# Patient Record
Sex: MALE | Race: WHITE | NOT HISPANIC OR LATINO | Employment: FULL TIME | URBAN - METROPOLITAN AREA
[De-identification: names, ages, dates, MRNs, and addresses within clinical notes are randomized per-mention and may not be internally consistent; named-entity substitution may affect disease eponyms.]

---

## 2023-06-12 ENCOUNTER — TELEPHONE (OUTPATIENT)
Dept: FAMILY MEDICINE CLINIC | Facility: CLINIC | Age: 23
End: 2023-06-12

## 2023-06-12 NOTE — TELEPHONE ENCOUNTER
This patient would like to become a new patient of yours, trans prefer name Kylah. Can I schedule?

## 2023-06-15 NOTE — TELEPHONE ENCOUNTER
Attempted to call patient to schedule initial eval with [facility]. Patient's voicemail is not set up.

## 2023-08-09 ENCOUNTER — TELEPHONE (OUTPATIENT)
Dept: BEHAVIORAL HEALTH | Facility: CLINIC | Age: 23
End: 2023-08-09

## 2023-08-09 NOTE — TELEPHONE ENCOUNTER
LVM reminding patient that we have not yet received their new patient paperwork for GREGORY Vincent. Patient may bring paperwork to appointment if they are unable to return it to our office at an earlier date.
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
## 2023-08-22 ENCOUNTER — OFFICE VISIT (OUTPATIENT)
Dept: BEHAVIORAL HEALTH | Facility: CLINIC | Age: 23
End: 2023-08-22
Payer: COMMERCIAL

## 2023-08-22 VITALS
HEIGHT: 71 IN | OXYGEN SATURATION: 98 % | DIASTOLIC BLOOD PRESSURE: 60 MMHG | BODY MASS INDEX: 23.03 KG/M2 | WEIGHT: 164.5 LBS | RESPIRATION RATE: 12 BRPM | SYSTOLIC BLOOD PRESSURE: 112 MMHG | HEART RATE: 70 BPM

## 2023-08-22 DIAGNOSIS — F41.1 GAD (GENERALIZED ANXIETY DISORDER): ICD-10-CM

## 2023-08-22 DIAGNOSIS — F33.1 MDD (MAJOR DEPRESSIVE DISORDER), RECURRENT EPISODE, MODERATE: Primary | ICD-10-CM

## 2023-08-22 DIAGNOSIS — F60.9 PERSONALITY DISORDER: ICD-10-CM

## 2023-08-22 DIAGNOSIS — R41.840 POOR CONCENTRATION: ICD-10-CM

## 2023-08-22 DIAGNOSIS — F64.9 GENDER DYSPHORIA: ICD-10-CM

## 2023-08-22 RX ORDER — BUPROPION HYDROCHLORIDE 150 MG/1
150 TABLET ORAL EVERY MORNING
Qty: 30 TABLET | Refills: 0 | Status: SHIPPED | OUTPATIENT
Start: 2023-08-22

## 2023-08-22 NOTE — PATIENT INSTRUCTIONS
Medication changes: Okay to start Wellbutrin/bupropion  mg take first thing in the morning with small amount of food at first, please read attached handout on new medication.    Therapy recommendation: Referral placed to [facility] for counseling and neuropsych testing, patient educated that there are multiple overlapping symptoms of depression/ADHD/anxiety/borderline personality disorder, please read attached handout on borderline personality disorder.  Give my office 7 days to reach out to you about referral.    GENERAL NEW PATIENT INSTRUCTIONS:  -The best way to get a hold of [facility] is to call the office at [phone] and ask to leave a message with his medical assistant.  Patient's are not able to message their mental health provider directly via [employer], please give my office up to 48 hours to respond to a patient call/question/refill request.  -Please call 911 or go to the nearest ER if you begin to have thoughts of harming yourself or other people.  -Refill requests will be made during normal office hours, Monday-Friday 8:00-5:30.  [facility] is out of the office on Wednesdays and weekends.  Follow-up appointments must be maintained in order for prescribing to continue.    -Tuesdays and Thursdays are [facility]'s in-office days, Mondays and Fridays are his telehealth days.  The decision to be seen virtually or in person is up to the discretion of the provider, not all behavioral health problems are appropriate for telehealth.    NO SHOW POLICY:  We understand unexpected circumstances arise; however, anytime you miss your appointment we are unable to provide you appropriate care.  In addition, each appointment missed could have been used to provide care for others.  We ask that you call at least 24 hours in advance to cancel or reschedule an appointment. We would like to take this opportunity to remind you of our policy stating patients who miss THREE or more appointments without cancelling or  rescheduling 24 hours in advance of the appointment may be subject to cancellation of any further visits with our clinic and recommendation to seek in-person services/visits. Please call [phone] to reschedule your appointment. If there are reasons that make it difficult for you to keep the appointments, please call and let us know how we can help. Please understand that medication prescribing will not continue without seeing your provider.       [facility]   [address] [place] [address]  P: [phone]  F: [phone]

## 2023-09-19 ENCOUNTER — TELEMEDICINE (OUTPATIENT)
Dept: BEHAVIORAL HEALTH | Facility: CLINIC | Age: 23
End: 2023-09-19
Payer: COMMERCIAL

## 2023-09-19 ENCOUNTER — OFFICE VISIT (OUTPATIENT)
Dept: FAMILY MEDICINE CLINIC | Facility: CLINIC | Age: 23
End: 2023-09-19
Payer: COMMERCIAL

## 2023-09-19 VITALS
HEART RATE: 73 BPM | BODY MASS INDEX: 22.4 KG/M2 | HEIGHT: 71 IN | WEIGHT: 160 LBS | OXYGEN SATURATION: 98 % | DIASTOLIC BLOOD PRESSURE: 62 MMHG | SYSTOLIC BLOOD PRESSURE: 120 MMHG | RESPIRATION RATE: 18 BRPM

## 2023-09-19 DIAGNOSIS — F41.1 GAD (GENERALIZED ANXIETY DISORDER): ICD-10-CM

## 2023-09-19 DIAGNOSIS — R41.840 POOR CONCENTRATION: ICD-10-CM

## 2023-09-19 DIAGNOSIS — E34.9 HORMONE IMBALANCE: ICD-10-CM

## 2023-09-19 DIAGNOSIS — F64.0 GENDER DYSPHORIA IN ADULT: Primary | ICD-10-CM

## 2023-09-19 DIAGNOSIS — F33.1 MDD (MAJOR DEPRESSIVE DISORDER), RECURRENT EPISODE, MODERATE: Primary | ICD-10-CM

## 2023-09-19 DIAGNOSIS — F60.3 BORDERLINE PERSONALITY DISORDER: ICD-10-CM

## 2023-09-19 PROCEDURE — 99203 OFFICE O/P NEW LOW 30 MIN: CPT | Performed by: FAMILY MEDICINE

## 2023-09-19 RX ORDER — ESTRADIOL VALERATE 20 MG/ML
INJECTION INTRAMUSCULAR
Qty: 5 ML | Refills: 3 | Status: SHIPPED | OUTPATIENT
Start: 2023-09-19

## 2023-09-19 RX ORDER — BUPROPION HYDROCHLORIDE 300 MG/1
300 TABLET ORAL EVERY MORNING
Qty: 30 TABLET | Refills: 0 | Status: SHIPPED | OUTPATIENT
Start: 2023-09-19

## 2023-09-19 NOTE — PROGRESS NOTES
Patient assessed today via MyChart through [employer] pt is at home in a secure environment and verbalizes privacy during interview. KENTON Holman is at home in a secure environment using a secure laptop.The patient's condition being diagnosed/treated is appropriate for telemedicine.The provider identified himself as well as his credentials.The patient, and/or patient's guardian, consent to be seen remotely, and when consent is given they understand that the consent allows for patient identifiable information to be sent to a third party as needed. They may refuse to be seen remotely at any time. The electronic data is encrypted and password protected, and the patient and/or guardian has been advised of the potential risks to privacy not withstanding such measures.    [facility] PROGRESS NOTE  [facility]  [address], [place] [address]    NAME: Vladimir Daniels     : 2000   MRN: [id number]   PCP: Provider, No Known     DATE: 2023    ALLERGY:Patient has no known allergies.     MEDICATIONS:  buPROPion XL     VITALS: There were no vitals taken for this visit. No LMP for male patient.     Subjective     Chief Complaint   Patient presents with    Depression    Anxiety    Follow-up      HPI:  23 y.o. male patient seen for follow up. Patient last seen roughly 1 month ago and Wellbutrin was started for depression/anxiety/poor concentration, since that time patient reports mild improvement is interested in increasing the dose, patient reports they never followed up with referral to [facility] for counseling.  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  Patient was educated previously on borderline personality disorder, reports they do have some traits.    Social Status:  Ethnic Group: Not  Or   Race: White Or   Marital Status: Single    Employment status: Full Time [employer] [place]      SUBSTANCE/SEXUAL HISTORY:   reports that she has been smoking cigarettes. She has a 0.75 pack-year smoking history. She has never used smokeless tobacco. She reports current alcohol use. She reports current drug use. Drugs: Marijuana and Other.   reports being sexually active and has had partner(s) who are female. She reports using the following method of birth control/protection: Condom.      FAMILY HISTORY:  family history includes ADD / ADHD in her maternal grandfather; Bipolar disorder in her maternal grandmother; Depression in her maternal grandmother.     PAST MEDICAL HISTORY   has no past medical history of Alcohol abuse, Head injury, Seizures, or Substance abuse.     Review of Systems   Constitutional: Negative.    Respiratory:  Negative for chest tightness and shortness of breath.    Cardiovascular:  Negative for chest pain.   Gastrointestinal:  Negative for nausea and vomiting.   Neurological:  Negative for dizziness and light-headedness.   Psychiatric/Behavioral:  Positive for decreased concentration, sleep disturbance and depressed mood. Negative for suicidal ideas. The patient is nervous/anxious.      Objective   Physical Exam  Constitutional:       Appearance: Normal appearance.   HENT:      Head: Normocephalic.   Pulmonary:      Effort: Pulmonary effort is normal.   Skin:     General: Skin is dry.   Neurological:      General: No focal deficit present.      Mental Status: She is alert and oriented to person, place, and time.   Psychiatric:         Behavior: Behavior normal.         Thought Content: Thought content normal.     PHQ-9 Depression Screening  Little interest or pleasure in doing things? (P) 1-->several days   Feeling down, depressed, or hopeless? (P) 0-->not at all   Trouble falling or staying asleep, or sleeping too much? (P) 2-->more than half the days   Feeling tired or having little energy?     Poor appetite or overeating? (P)  1-->several days   Feeling bad about yourself/you are a failure/have let yourself or your family down? (P) 1-->several days   Trouble concentrating on things? (P) 1-->several days   Psychomotor agitation/retardation (P) 0-->not at all   Thoughts about death/dying/suicide (P) 0-->not at all   PHQ-9 Total Score (P) 6   How difficult have these problems for you? (P) somewhat difficult     GAD7 Documentation:  Feeling nervous, anxious or on edge (P) 0   Not being able to stop or control worrying (P) 0   Worrying too much about different things (P) 0   Trouble relaxing (P) 1   Being so restless that it is hard to sit still (P) 1   Becoming easily annoyed or irritable (P) 0   Feeling Afraid as if something awful might happen (P) 0   ONUR Total Score (P) 2   How difficult have these problems made it for you? (P) Not difficult at all     MENTAL STATUS EXAM   General Appearance:  Cleanly groomed and dressed  Eye Contact:  Good eye contact  Attitude:  Cooperative  Motor Activity:  Normal gait, posture  Speech:  Normal rate, tone, volume  Mood and affect:  Normal, pleasant  Thought Process:  Goal-directed  Associations/ Thought Content:  No delusions  Hallucinations:  None  Suicidal Ideations:  Not present  Homicidal Ideation:  Not present  Orientation:  Person, place, time and situation  Fund of Knowledge:  Appropriate for age and educational level  Intellectual Functioning:  Average range  Insight:  Fair  Judgement:  Fair  Reliability:  Fair  Impulse Control:  Fair     LABS:  CBC          5/24/2023    00:05   CBC   WBC 8.51       RBC 5.07       Hemoglobin 14.6       Hematocrit 40.5       MCV 79.9       MCH 28.8       MCHC 36.0       RDW 12.4       Platelets 195          Details          This result is from an external source.                 Assessment    ASSESSMENT/TREATMENT PLAN/INSTRUCTIONS/EDUCATION    (F33.1) MDD (major depressive disorder), recurrent episode, moderate - Plan: buPROPion XL (Wellbutrin XL) 300 MG 24 hr  tablet    (F41.1) ONUR (generalized anxiety disorder) - Plan: buPROPion XL (Wellbutrin XL) 300 MG 24 hr tablet    (F60.3) Borderline personality traits    (R41.840) Poor concentration - Plan: buPROPion XL (Wellbutrin XL) 300 MG 24 hr tablet     -The above listed condition/conditions are improved somewhat    AFTER VISIT SUMMARY INSTRUCTIONS:    Medication changes: Okay to increase Wellbutrin to 300 mg extended release take first thing in the morning.  Patient instructed to try over-the-counter melatonin, the oral disintegrating tablet tends to work better it comes in a grape flavor.  Can address sleep issues more thoroughly at next appointment but I believe it will improve the longer they are on Wellbutrin.    Therapy recommendation: Patient instructed to follow-up with [facility] for counseling.    Return in 1 month (on 10/19/2023) for Medication Check.    MEDICATION ISSUES:  SANCHO: Reviewed 09/19/2023     Medications Discontinued During This Encounter   Medication Reason    buPROPion XL (Wellbutrin XL) 150 MG 24 hr tablet Reorder     New Medications Ordered This Visit   Medications    buPROPion XL (Wellbutrin XL) 300 MG 24 hr tablet     Sig: Take 1 tablet by mouth Every Morning.     Dispense:  30 tablet     Refill:  0      No orders of the defined types were placed in this encounter.    -Barriers: age < 25 or > 60 and new to treatment  -Strengths:  motivated     -Progress toward goal: Not at goal  -Functional Status: Moderate impairment   -Prognosis: Fair with Ongoing Treatment      Part of this note may be an electronic transcription/translation of spoken language to printed text using the Dragon Dictation System. Some of the data in this electronic note has been brought forward from a previous encounter, any necessary changes have been made, it has been reviewed by this APRN, and it is accurate.    This document has been electronically signed by THANH Vincent September 19, 2023 12:46  EDT

## 2023-09-19 NOTE — PATIENT INSTRUCTIONS
"Transfemme Resources    [facility] Transgender Support Group - Facebook group and Discord     World Professional Association for Transgender Health, Standards of Care  https://www.wpath.org/publications/soc - pages 38 and 40 especially     [facility], Transgender Health - [email or web address]://[email or web address].[email or web address]/guidelines      Alexy Schmidt Fred - [email or web address]    [facility] - https://[facility]/care/medical/transgender-health/     Parents, Families, and Friends of Lesbians and Malcom - https://www.pflag.org/ourtranslovedones  - good document for friends/family who need to learn the basics     [facility] - https://transequality.org/documents  - helps with name change, gender marker change, etc, is state specific     * * * * * *    Insurance will typically cover hormones, but if they don't, or if you need to pay out of pocket, use the following site.    Glomera     Can get injection supplies online - HRsoft. Recommended supplies as below:     1 ml, Luer-Hector Syringes      18 G x 1.5” hypodermic needles for drawing up      25 G x 5/8” or 27 G 1/2\" hypodermic needles for injecting      [facility] Health Injection Guide -   [email or web address]-content/uploads/MG-[email or web address]_TransHealth_InjectionGuide.pdf     Injection supplies:  Syringes   Needles   Alcohol swabs  Cotton balls/band-aids  Sharps container     Gender-affirming Mental Health Providers    [facility]  [address] [place] [address] [phone]  Dr. Vandana Eller, PhD  + several other therapists  Commercial and [place] Medicaid    [facility]/new-patients  [address] [facility] [address] [facility] [address] [place] [address] [phone]  Several therapists  Commercial Insurance    [facility]  [employer]  [address] [place] [address] [phone]  Dr. Zaki Moran, PsyD  + several other therapists  Commercial Insurance    [facility]  [employer]  [address] [place] [address]  [phone]  Several therapists  [place]mercial and [place] Medicaid    Ney White LCSW  (Online only)  [phone]  Phone: [phone] (on website)  [employer]  [email or web address]    Padmini Johnson [facility]  [employer] [facility]  [address] [facility]  [place] [address]  Phone :[phone]  Fax: [phone]  [email or web address]  [email or web address]    [facility]  Stuyvesantpsychologist.[employer]  [address], [facility] [address] [place] [address]  [phone]  Taylor Amaral [facility]  [place]mercial and [place] Medicaid    [facility]  [facility]"[employer]  [address] [facility] [address] [place] [address]   [phone]  SUKHI Morales  Commercial & sliding scale    [facility]  [email or web address]  [address], [facility] [address] [place] [address]  [phone]  Rosa M Gomez LCSW  [employer] & self-pay    [facility]  [facility]sSt[facility] Hospit[facility].org  [address] [facility] [address] [place] [address]  [phone]  Ирина Arechiga LCSW  [place] and [place] Medicaid    [facility] [email or web address]  [address] [place] [address] [phone]  SUKHI Hope  Commercial Insurance    [facility]  [facility].org  [address] [facility] [address] [place], [place] [address] [phone]  ANTONIO Vasquez  Self-pay with sliding scale    NORBERTO Avina (telehealth only)  Shai Gant, Counselor, [place], [address]  Psych[employer] Today  [phone]  Self-pay    24/7 Crisis Supports  National Crisis Text #: 'HOME' to 247-400  [employer] LGBTQ+ Crisis line: 1-530.780.1915  [employer] Crisis Text #: 'START' to 356-470

## 2023-09-19 NOTE — PATIENT INSTRUCTIONS
Medication changes: Okay to increase Wellbutrin to 300 mg extended release take first thing in the morning.  Patient instructed to try over-the-counter melatonin, the oral disintegrating tablet tends to work better it comes in a grape flavor.  Can address sleep issues more thoroughly at next appointment but I believe it will improve the longer they are on Wellbutrin.    Therapy recommendation: Patient instructed to follow-up with [facility] for counseling.    GENERAL NEW PATIENT INSTRUCTIONS:  -The best way to get a hold of River is to call the office at [phone] and ask to leave a message with his medical assistant.  Patient's are not able to message their mental health provider directly via Bloxy, please give my office up to 48 hours to respond to a patient call/question/refill request.  -Please call 911 or go to the nearest ER if you begin to have thoughts of harming yourself or other people.  -Refill requests will be made during normal office hours, Monday-Friday 8:00-5:30.  River is out of the office on Wednesdays and weekends.  Follow-up appointments must be maintained in order for prescribing to continue.    -Tuesdays and Thursdays are River's in-office days, Mondays and Fridays are his telehealth days.  The decision to be seen virtually or in person is up to the discretion of the provider, not all behavioral health problems are appropriate for telehealth.    NO SHOW POLICY:  We understand unexpected circumstances arise; however, anytime you miss your appointment we are unable to provide you appropriate care.  In addition, each appointment missed could have been used to provide care for others.  We ask that you call at least 24 hours in advance to cancel or reschedule an appointment. We would like to take this opportunity to remind you of our policy stating patients who miss THREE or more appointments without cancelling or rescheduling 24 hours in advance of the appointment may be subject  to cancellation of any further visits with our clinic and recommendation to seek in-person services/visits. Please call [phone] to reschedule your appointment. If there are reasons that make it difficult for you to keep the appointments, please call and let us know how we can help. Please understand that medication prescribing will not continue without seeing your provider.       [facility]   [address] [place] [address]  P: [phone]  F: [phone]

## 2023-09-19 NOTE — PROGRESS NOTES
"Chief Complaint  Establish Care and Gender Dysphoria in Adult    Subjective    History of Present Illness {CC  Problem List  Visit  Diagnosis   Encounters  Notes  Medications  Labs  Result Review Imaging  Media :23}     Vladimir Daniels presents to [facility] for Establish Care and Gender Dysphoria in Adult.  History of Present Illness     Here today as above. Has felt different since an early age. Was attracted to both men and women at the time, but didn't give much though to gender identity at the time. Began to use \"they\" pronouns a couple years ago, switched to \"she\" at the beginning of this year. Has support from mom, but hasn't yet come out fully to dad. He's described as \"quite Tenriism.\" Has good social support otherwise, is excited to begin her transition. Been looking into trans-affirming therapists as well.     Done some prelim reading about gender-affirming hormone therapy. No concerns about her ability to self-admin estradiol via injection. Takes bupropion daily, no other meds.     Objective     Vital Signs:   /62   Pulse 73   Resp 18   Ht 180.3 cm (71\")   Wt 72.6 kg (160 lb)   SpO2 98%   BMI 22.32 kg/m²   Physical Exam  Vitals and nursing note reviewed.   Constitutional:       General: She is not in acute distress.     Appearance: Normal appearance. She is not ill-appearing.   Cardiovascular:      Rate and Rhythm: Normal rate and regular rhythm.      Pulses: Normal pulses.      Heart sounds: Normal heart sounds. No murmur heard.  Pulmonary:      Effort: Pulmonary effort is normal. No respiratory distress.      Breath sounds: Normal breath sounds. No rales.   Neurological:      Mental Status: She is alert and oriented to person, place, and time. Mental status is at baseline.   Psychiatric:         Mood and Affect: Mood normal.         Behavior: Behavior normal.        Result Review  Data Reviewed:{ Labs  Result Review  Imaging  Med Tab  Media " :23}                   Assessment and Plan {CC Problem List  Visit Diagnosis  ROS  Review (Popup)  Health Maintenance  Quality  BestPractice  Medications  SmartSets  SnapShot Encounters  Media :23}   Diagnoses and all orders for this visit:    1. Gender dysphoria in adult (Primary)  -     estradiol valerate (DELESTROGEN) 20 MG/ML injection; Inject 0.15 ml (3 mg) subcutaneously twice weekly.  Dispense: 5 mL; Refill: 3    2. Hormone imbalance  -     estradiol valerate (DELESTROGEN) 20 MG/ML injection; Inject 0.15 ml (3 mg) subcutaneously twice weekly.  Dispense: 5 mL; Refill: 3    Established gender dysphoria, hormonal imbalance in a transgender patient with a strong desire for gender affirming hormone therapy.     Gender identity is consistent, persistent, and insistent. Discussed risks, benefits, alternatives, potential side effects of therapy, and typical follow up. Resources provided including [facility] Standards of Care, PFLAG “Our Trans Loved Ones”, and local support groups.     Meds as above. Gave detailed instructions as to proper subcutaneous injection technique and demonstrated the same. Given a bag of sample injection supplies to last until they are able to obtain their own supply. F/u with a phone call/message in 2-3 weeks, and an office visit in 2 months. Will order labs at f/u. Encouraged to sign up for and use [employer].    >50% of this 30 min visit spent in counseling about gender-affirming hormone therapy.     Patient was given instructions and counseling regarding her condition or for health maintenance advice. Please see specific information pulled into the AVS (placed there by myself) if appropriate.    Return in about 3 months (around 12/19/2023), or if symptoms worsen or fail to improve, for f/u gender-affirming hormone therapy.      ASHLEY Wall MD

## 2023-10-19 ENCOUNTER — TELEMEDICINE (OUTPATIENT)
Dept: BEHAVIORAL HEALTH | Facility: CLINIC | Age: 23
End: 2023-10-19
Payer: COMMERCIAL

## 2023-10-19 DIAGNOSIS — F60.3 BORDERLINE PERSONALITY DISORDER: ICD-10-CM

## 2023-10-19 DIAGNOSIS — R41.840 POOR CONCENTRATION: ICD-10-CM

## 2023-10-19 DIAGNOSIS — F33.1 MDD (MAJOR DEPRESSIVE DISORDER), RECURRENT EPISODE, MODERATE: Primary | ICD-10-CM

## 2023-10-19 DIAGNOSIS — F41.1 GAD (GENERALIZED ANXIETY DISORDER): ICD-10-CM

## 2023-10-19 RX ORDER — BUPROPION HYDROCHLORIDE 300 MG/1
300 TABLET ORAL EVERY MORNING
Qty: 90 TABLET | Refills: 0 | Status: SHIPPED | OUTPATIENT
Start: 2023-10-19

## 2023-10-19 NOTE — PROGRESS NOTES
Patient assessed today via MyChart through EPIC pt is at home in a secure environment and verbalizes privacy during interview. KENTON Holman is at home in a secure environment using a secure laptop.The patient's condition being diagnosed/treated is appropriate for telemedicine.The provider identified himself as well as his credentials.The patient, and/or patient's guardian, consent to be seen remotely, and when consent is given they understand that the consent allows for patient identifiable information to be sent to a third party as needed. They may refuse to be seen remotely at any time. The electronic data is encrypted and password protected, and the patient and/or guardian has been advised of the potential risks to privacy not withstanding such measures.    [facility] PROGRESS NOTE  [facility]  [address], [place] [address]    NAME: Vladimir Daniels     : 2000   MRN: [id number]     DATE: 10/19/2023    ALLERGY:Patient has no known allergies.     MEDICATIONS:  buPROPion XL  estradiol valerate     VITALS: There were no vitals taken for this visit. No LMP recorded.     Subjective     Chief Complaint   Patient presents with    Depression    Anxiety      HPI:  23 y.o. adult patient seen for follow up.  Patient seen last roughly 1 month ago at that time Wellbutrin dose was increased to 300 mg due to only partial improvement noted, since that time patient reports things are all right overall, reports they need to work on healthier habits including personal hygiene, nutrition, sleep, exercise in order to feel better which I agree with, reports work going good, patient reports they plan on moving to [place] November 15 will be living with a friend, does not have a job lined up as of yet, reports they are financially ready to do so.  Patient did not follow-up with [facility] or [facility] as planned.    Social Status:  Ethnic Group: Not  Or    Race: White Or   Marital Status: Single   Employment status: Full Time 6/10 [employer]      SUBSTANCE/SEXUAL HISTORY:   reports that she has quit smoking. Her smoking use included cigarettes. She has a 0.75 pack-year smoking history. She has been exposed to tobacco smoke. She has never used smokeless tobacco. She reports current alcohol use of about 5.0 standard drinks of alcohol per week. She reports current drug use. Drugs: Marijuana and Other.   reports being sexually active and has had partner(s) who are female. She reports using the following method of birth control/protection: Condom.      FAMILY HISTORY:  family history includes ADD / ADHD in her maternal grandfather; Bipolar disorder in her maternal grandmother; Depression in her maternal grandmother and mother; No Known Problems in her brother, father, and sister.     PAST MEDICAL HISTORY   has a past medical history of Anxiety (2019) and Depression (2018).    She has no past medical history of Alcohol abuse, Head injury, Seizures, or Substance abuse.     Review of Systems   Constitutional: Negative.    Respiratory:  Negative for chest tightness and shortness of breath.    Cardiovascular:  Negative for chest pain.   Neurological:  Negative for dizziness and light-headedness.   Psychiatric/Behavioral:  Negative for sleep disturbance and suicidal ideas.      Objective   Physical Exam  Constitutional:       Appearance: Normal appearance.   HENT:      Head: Normocephalic.   Pulmonary:      Effort: Pulmonary effort is normal.   Skin:     General: Skin is dry.   Neurological:      General: No focal deficit present.      Mental Status: She is alert and oriented to person, place, and time.   Psychiatric:         Behavior: Behavior normal.         Thought Content: Thought content normal.     PHQ-9 Depression Screening  Little interest or pleasure in doing things? (P) 1-->several days   Feeling down, depressed, or hopeless? (P) 0-->not at all   Trouble  falling or staying asleep, or sleeping too much? (P) 1-->several days   Feeling tired or having little energy?     Poor appetite or overeating? (P) 1-->several days   Feeling bad about yourself/you are a failure/have let yourself or your family down? (P) 1-->several days   Trouble concentrating on things? (P) 0-->not at all   Psychomotor agitation/retardation (P) 0-->not at all   Thoughts about death/dying/suicide (P) 0-->not at all   PHQ-9 Total Score (P) 5   How difficult have these problems for you? (P) somewhat difficult     GAD7 Documentation:  Feeling nervous, anxious or on edge (P) 0   Not being able to stop or control worrying (P) 0   Worrying too much about different things (P) 1   Trouble relaxing (P) 0   Being so restless that it is hard to sit still (P) 0   Becoming easily annoyed or irritable (P) 1   Feeling Afraid as if something awful might happen (P) 1   ONUR Total Score (P) 3   How difficult have these problems made it for you? (P) Not difficult at all     MENTAL STATUS EXAM   General Appearance:  Cleanly groomed and dressed  Eye Contact:  Fair  Attitude:  Cooperative  Speech:  Monotone  Mood and affect:  Flat  Thought Process:  Goal-directed  Associations/ Thought Content:  No delusions  Hallucinations:  None  Suicidal Ideations:  Not present  Homicidal Ideation:  Not present  Sensorium:  Alert  Orientation:  Person, place, time and situation  Fund of Knowledge:  Appropriate for age and educational level  Intellectual Functioning:  Average range  Insight:  Fair  Judgement:  Fair  Reliability:  Fair  Impulse Control:  Fair       LABS:  CBC          5/24/2023    00:05   CBC   WBC 8.51       RBC 5.07       Hemoglobin 14.6       Hematocrit 40.5       MCV 79.9       MCH 28.8       MCHC 36.0       RDW 12.4       Platelets 195          Details          This result is from an external source.                 Assessment    ASSESSMENT/TREATMENT PLAN/INSTRUCTIONS/EDUCATION    (F33.1) MDD (major depressive  disorder), recurrent episode, moderate - Plan: buPROPion XL (Wellbutrin XL) 300 MG 24 hr tablet    (F41.1) ONUR (generalized anxiety disorder) - Plan: buPROPion XL (Wellbutrin XL) 300 MG 24 hr tablet    (F60.3) Borderline personality traits - Plan: buPROPion XL (Wellbutrin XL) 300 MG 24 hr tablet    (R41.840) Poor concentration - Plan: buPROPion XL (Wellbutrin XL) 300 MG 24 hr tablet     -MDD/ONUR: Stable/improved with current treatment. Pt reports daily compliance with medications, denies side effects.     AFTER VISIT SUMMARY INSTRUCTIONS:    Medication changes:   Wellbutrin, continue as previously ordered, 90-day supply sent to pharmacy to hold patient over until they can find a new mental health provider in [place], advised to look into [email or web address].    Return if symptoms worsen or fail to improve.    MEDICATION ISSUES:  SANCHO: Reviewed 10/19/2023     Medications Discontinued During This Encounter   Medication Reason    buPROPion XL (Wellbutrin XL) 300 MG 24 hr tablet Reorder       New Medications Ordered This Visit   Medications    buPROPion XL (Wellbutrin XL) 300 MG 24 hr tablet     Sig: Take 1 tablet by mouth Every Morning.     Dispense:  90 tablet     Refill:  0        No orders of the defined types were placed in this encounter.    -Barriers: age < 25 or > 60 and new to treatment  -Strengths:  motivated     -Progress toward goal: Not at goal  -Functional Status: Moderate impairment   -Prognosis: Fair with Ongoing Treatment      Part of this note may be an electronic transcription/translation of spoken language to printed text using the Dragon Dictation System. Some of the data in this electronic note has been brought forward from a previous encounter, any necessary changes have been made, it has been reviewed by this APRN, and it is accurate.    This document has been electronically signed by THANH Vincent October 19, 2023 11:00 EDT

## 2023-12-26 ENCOUNTER — OFFICE VISIT (OUTPATIENT)
Dept: FAMILY MEDICINE CLINIC | Facility: CLINIC | Age: 23
End: 2023-12-26
Payer: COMMERCIAL

## 2023-12-26 VITALS
OXYGEN SATURATION: 97 % | RESPIRATION RATE: 18 BRPM | BODY MASS INDEX: 22.82 KG/M2 | HEART RATE: 85 BPM | SYSTOLIC BLOOD PRESSURE: 122 MMHG | DIASTOLIC BLOOD PRESSURE: 62 MMHG | HEIGHT: 71 IN | WEIGHT: 163 LBS

## 2023-12-26 DIAGNOSIS — F64.0 GENDER DYSPHORIA IN ADULT: Primary | ICD-10-CM

## 2023-12-26 DIAGNOSIS — R41.840 POOR CONCENTRATION: ICD-10-CM

## 2023-12-26 DIAGNOSIS — E34.9 HORMONE IMBALANCE: ICD-10-CM

## 2023-12-26 DIAGNOSIS — F41.1 GAD (GENERALIZED ANXIETY DISORDER): ICD-10-CM

## 2023-12-26 DIAGNOSIS — F60.3 BORDERLINE PERSONALITY DISORDER: ICD-10-CM

## 2023-12-26 DIAGNOSIS — F33.1 MDD (MAJOR DEPRESSIVE DISORDER), RECURRENT EPISODE, MODERATE: ICD-10-CM

## 2023-12-26 DIAGNOSIS — Z51.81 THERAPEUTIC DRUG MONITORING: ICD-10-CM

## 2023-12-26 RX ORDER — ESTRADIOL VALERATE 20 MG/ML
INJECTION INTRAMUSCULAR
Qty: 5 ML | Refills: 3 | Status: SHIPPED | OUTPATIENT
Start: 2023-12-26 | End: 2023-12-26 | Stop reason: SDUPTHER

## 2023-12-26 RX ORDER — ESTRADIOL VALERATE 20 MG/ML
INJECTION INTRAMUSCULAR
Qty: 5 ML | Refills: 3 | Status: SHIPPED | OUTPATIENT
Start: 2023-12-26

## 2023-12-26 RX ORDER — BUPROPION HYDROCHLORIDE 300 MG/1
300 TABLET ORAL EVERY MORNING
Qty: 90 TABLET | Refills: 3 | Status: SHIPPED | OUTPATIENT
Start: 2023-12-26

## 2023-12-26 RX ORDER — BUPROPION HYDROCHLORIDE 300 MG/1
300 TABLET ORAL EVERY MORNING
Qty: 90 TABLET | Refills: 3 | Status: SHIPPED | OUTPATIENT
Start: 2023-12-26 | End: 2023-12-26 | Stop reason: SDUPTHER

## 2023-12-26 NOTE — PROGRESS NOTES
"Chief Complaint  Gender Dysphoria in Adult    Subjective    History of Present Illness {CC  Problem List  Visit  Diagnosis   Encounters  Notes  Medications  Labs  Result Review Imaging  Media :23}     Vladimir Daniels presents to [facility] for Gender Dysphoria in Adult.  History of Present Illness     Here today for follow-up as above. Has been doing fairly well overall. Due for a check of hormone labs today. No problems with self administration, no injection site reactions. Happy with the state of her transition. Needs refill of estradiol today.    Also asking for refill of bupropion. Was seen by Maxwell Moyer previously and diagnosed with anxiety and depression. Has been taking bupropion with good symptom control overall. Has helped with concentration additionally. No unwanted side effects.    Objective     Vital Signs:   /62   Pulse 85   Resp 18   Ht 180.3 cm (71\")   Wt 73.9 kg (163 lb)   SpO2 97%   BMI 22.73 kg/m²   Physical Exam  Vitals and nursing note reviewed.   Constitutional:       General: She is not in acute distress.     Appearance: Normal appearance. She is not ill-appearing.   Cardiovascular:      Rate and Rhythm: Normal rate and regular rhythm.      Pulses: Normal pulses.      Heart sounds: Normal heart sounds. No murmur heard.  Pulmonary:      Effort: Pulmonary effort is normal. No respiratory distress.      Breath sounds: Normal breath sounds. No rales.   Neurological:      Mental Status: She is alert and oriented to person, place, and time. Mental status is at baseline.   Psychiatric:         Mood and Affect: Mood normal.         Behavior: Behavior normal.          Result Review  Data Reviewed:{ Labs  Result Review  Imaging  Med Tab  Media :23}                   Assessment and Plan {CC Problem List  Visit Diagnosis  ROS  Review (Popup)  Health Maintenance  Quality  BestPractice  Medications  SmartSets  SnapShot Encounters  " Media :23}   Diagnoses and all orders for this visit:    1. Gender dysphoria in adult (Primary)  -     Estradiol  -     Estrone  -     Testosterone  -     Comprehensive Metabolic Panel  -     Lipid Panel  -     Discontinue: estradiol valerate (DELESTROGEN) 20 MG/ML injection; Inject 0.15 ml (3 mg) subcutaneously twice weekly.  Dispense: 5 mL; Refill: 3  -     estradiol valerate (DELESTROGEN) 20 MG/ML injection; Inject 0.15 ml (3 mg) subcutaneously twice weekly.  Dispense: 5 mL; Refill: 3    2. Hormone imbalance  -     Estradiol  -     Estrone  -     Testosterone  -     Comprehensive Metabolic Panel  -     Lipid Panel  -     Discontinue: estradiol valerate (DELESTROGEN) 20 MG/ML injection; Inject 0.15 ml (3 mg) subcutaneously twice weekly.  Dispense: 5 mL; Refill: 3  -     estradiol valerate (DELESTROGEN) 20 MG/ML injection; Inject 0.15 ml (3 mg) subcutaneously twice weekly.  Dispense: 5 mL; Refill: 3    3. Therapeutic drug monitoring  -     Estradiol  -     Estrone  -     Testosterone  -     Comprehensive Metabolic Panel  -     Lipid Panel    4. MDD (major depressive disorder), recurrent episode, moderate  -     Discontinue: buPROPion XL (Wellbutrin XL) 300 MG 24 hr tablet; Take 1 tablet by mouth Every Morning.  Dispense: 90 tablet; Refill: 3  -     buPROPion XL (Wellbutrin XL) 300 MG 24 hr tablet; Take 1 tablet by mouth Every Morning.  Dispense: 90 tablet; Refill: 3    5. ONUR (generalized anxiety disorder)  -     Discontinue: buPROPion XL (Wellbutrin XL) 300 MG 24 hr tablet; Take 1 tablet by mouth Every Morning.  Dispense: 90 tablet; Refill: 3  -     buPROPion XL (Wellbutrin XL) 300 MG 24 hr tablet; Take 1 tablet by mouth Every Morning.  Dispense: 90 tablet; Refill: 3    6. Borderline personality traits  -     Discontinue: buPROPion XL (Wellbutrin XL) 300 MG 24 hr tablet; Take 1 tablet by mouth Every Morning.  Dispense: 90 tablet; Refill: 3  -     buPROPion XL (Wellbutrin XL) 300 MG 24 hr tablet; Take 1 tablet by  mouth Every Morning.  Dispense: 90 tablet; Refill: 3    7. Poor concentration  -     Discontinue: buPROPion XL (Wellbutrin XL) 300 MG 24 hr tablet; Take 1 tablet by mouth Every Morning.  Dispense: 90 tablet; Refill: 3  -     buPROPion XL (Wellbutrin XL) 300 MG 24 hr tablet; Take 1 tablet by mouth Every Morning.  Dispense: 90 tablet; Refill: 3    Orders as above. I will contact her with results as available. Refilled bupropion and estradiol as requested. Continue regimen as prescribed.    Recommended follow up as below. Encouraged communication via Apajahart in the meantime.     Patient was given instructions and counseling regarding her condition or for health maintenance advice. Please see specific information pulled into the AVS (placed there by myself) if appropriate.    Return in about 3 months (around 3/26/2024), or if symptoms worsen or fail to improve, for f/u gender-affirming hormone therapy.    ASHLEY Wall MD

## 2023-12-27 LAB
ALBUMIN SERPL-MCNC: 4.5 G/DL (ref 4.3–5.2)
ALBUMIN/GLOB SERPL: 2.1 {RATIO} (ref 1.2–2.2)
ALP SERPL-CCNC: 56 IU/L (ref 44–121)
ALT SERPL-CCNC: 15 IU/L (ref 0–44)
AST SERPL-CCNC: 19 IU/L (ref 0–40)
BILIRUB SERPL-MCNC: 0.5 MG/DL (ref 0–1.2)
BUN SERPL-MCNC: 6 MG/DL (ref 6–20)
BUN/CREAT SERPL: 10 (ref 9–20)
CALCIUM SERPL-MCNC: 9.5 MG/DL (ref 8.7–10.2)
CHLORIDE SERPL-SCNC: 102 MMOL/L (ref 96–106)
CHOLEST SERPL-MCNC: 144 MG/DL (ref 100–199)
CO2 SERPL-SCNC: 23 MMOL/L (ref 20–29)
CREAT SERPL-MCNC: 0.59 MG/DL (ref 0.76–1.27)
EGFRCR SERPLBLD CKD-EPI 2021: 141 ML/MIN/1.73
ESTRADIOL SERPL-MCNC: 105 PG/ML (ref 7.6–42.6)
GLOBULIN SER CALC-MCNC: 2.1 G/DL (ref 1.5–4.5)
GLUCOSE SERPL-MCNC: 90 MG/DL (ref 70–99)
HDLC SERPL-MCNC: 58 MG/DL
LDLC SERPL CALC-MCNC: 73 MG/DL (ref 0–99)
POTASSIUM SERPL-SCNC: 4.6 MMOL/L (ref 3.5–5.2)
PROT SERPL-MCNC: 6.6 G/DL (ref 6–8.5)
SODIUM SERPL-SCNC: 138 MMOL/L (ref 134–144)
TESTOST SERPL-MCNC: 11 NG/DL (ref 264–916)
TRIGL SERPL-MCNC: 61 MG/DL (ref 0–149)
VLDLC SERPL CALC-MCNC: 13 MG/DL (ref 5–40)

## 2023-12-28 LAB — ESTRONE SERPL-MCNC: 72 PG/ML (ref 0–174)

## 2024-03-26 ENCOUNTER — OFFICE VISIT (OUTPATIENT)
Dept: FAMILY MEDICINE CLINIC | Facility: CLINIC | Age: 24
End: 2024-03-26
Payer: COMMERCIAL

## 2024-03-26 VITALS
BODY MASS INDEX: 22.12 KG/M2 | DIASTOLIC BLOOD PRESSURE: 62 MMHG | SYSTOLIC BLOOD PRESSURE: 116 MMHG | OXYGEN SATURATION: 99 % | RESPIRATION RATE: 18 BRPM | HEART RATE: 88 BPM | HEIGHT: 71 IN | WEIGHT: 158 LBS

## 2024-03-26 DIAGNOSIS — F60.3 BORDERLINE PERSONALITY DISORDER: ICD-10-CM

## 2024-03-26 DIAGNOSIS — F64.0 GENDER DYSPHORIA IN ADULT: Primary | ICD-10-CM

## 2024-03-26 DIAGNOSIS — R41.840 POOR CONCENTRATION: ICD-10-CM

## 2024-03-26 DIAGNOSIS — E34.9 HORMONE IMBALANCE: ICD-10-CM

## 2024-03-26 DIAGNOSIS — F41.1 GAD (GENERALIZED ANXIETY DISORDER): ICD-10-CM

## 2024-03-26 DIAGNOSIS — F33.1 MDD (MAJOR DEPRESSIVE DISORDER), RECURRENT EPISODE, MODERATE: ICD-10-CM

## 2024-03-26 DIAGNOSIS — Z51.81 THERAPEUTIC DRUG MONITORING: ICD-10-CM

## 2024-03-26 RX ORDER — ESTRADIOL VALERATE 20 MG/ML
INJECTION INTRAMUSCULAR
Qty: 5 ML | Refills: 3 | Status: SHIPPED | OUTPATIENT
Start: 2024-03-26

## 2024-03-26 RX ORDER — BUPROPION HYDROCHLORIDE 300 MG/1
300 TABLET ORAL EVERY MORNING
Qty: 90 TABLET | Refills: 3 | Status: SHIPPED | OUTPATIENT
Start: 2024-03-26

## 2024-03-26 NOTE — PROGRESS NOTES
"Chief Complaint  Gender Dysphoria in Adult (3 month f/u) and Jaw Pain (L side )    Subjective    History of Present Illness    Vladimir Daniels presents to [facility] for Gender Dysphoria in Adult (3 month f/u) and Jaw Pain (L side ).  History of Present Illness     Here today for follow-up as above. Doing well overall, continues on her estradiol injections as prescribed. No problems with self administration, no injection site reactions. Very happy with the state of her transition, no unwanted changes. Continues to experience some breast growth. Hoping for a refill of estradiol today. Also due for some routine blood work. Typically does her injections late Wednesday, has held off until now for labs.    Continues on bupropion for management of anxiety depression. Finds that it has helped significantly, no unwanted side effects. No problems with adherence or tolerance. Hoping for refill today.    Objective     Vital Signs:   /62   Pulse 88   Resp 18   Ht 180.3 cm (71\")   Wt 71.7 kg (158 lb)   SpO2 99%   BMI 22.04 kg/m²   Physical Exam  Vitals and nursing note reviewed.   Constitutional:       General: She is not in acute distress.     Appearance: Normal appearance. She is not ill-appearing.   Cardiovascular:      Rate and Rhythm: Normal rate and regular rhythm.      Pulses: Normal pulses.      Heart sounds: Normal heart sounds. No murmur heard.  Pulmonary:      Effort: Pulmonary effort is normal. No respiratory distress.      Breath sounds: Normal breath sounds. No rales.   Neurological:      Mental Status: She is alert and oriented to person, place, and time. Mental status is at baseline.   Psychiatric:         Mood and Affect: Mood normal.         Behavior: Behavior normal.                        Assessment and Plan   Diagnoses and all orders for this visit:    1. Gender dysphoria in adult (Primary)  -     Estradiol  -     Estrone  -     Testosterone  -     Comprehensive " Metabolic Panel  -     Progesterone  -     Lipid Panel  -     estradiol valerate (DELESTROGEN) 20 MG/ML injection; Inject 0.15 ml (3 mg) subcutaneously twice weekly.  Dispense: 5 mL; Refill: 3    2. Hormone imbalance  -     Estradiol  -     Estrone  -     Testosterone  -     Comprehensive Metabolic Panel  -     Progesterone  -     Lipid Panel  -     estradiol valerate (DELESTROGEN) 20 MG/ML injection; Inject 0.15 ml (3 mg) subcutaneously twice weekly.  Dispense: 5 mL; Refill: 3    3. Therapeutic drug monitoring  -     Estradiol  -     Estrone  -     Testosterone  -     Comprehensive Metabolic Panel  -     Progesterone  -     Lipid Panel    4. MDD (major depressive disorder), recurrent episode, moderate  -     buPROPion XL (Wellbutrin XL) 300 MG 24 hr tablet; Take 1 tablet by mouth Every Morning.  Dispense: 90 tablet; Refill: 3    5. ONUR (generalized anxiety disorder)  -     buPROPion XL (Wellbutrin XL) 300 MG 24 hr tablet; Take 1 tablet by mouth Every Morning.  Dispense: 90 tablet; Refill: 3    6. Borderline personality traits  -     buPROPion XL (Wellbutrin XL) 300 MG 24 hr tablet; Take 1 tablet by mouth Every Morning.  Dispense: 90 tablet; Refill: 3    7. Poor concentration  -     buPROPion XL (Wellbutrin XL) 300 MG 24 hr tablet; Take 1 tablet by mouth Every Morning.  Dispense: 90 tablet; Refill: 3    Orders as above. I will contact her with results as available. Continue regimen as prescribed for now. Refills as requested.    Recommended follow up as below. Encouraged communication via [employer] in the meantime.     Patient was given instructions and counseling regarding her condition or for health maintenance advice. Please see specific information pulled into the AVS (placed there by myself) if appropriate.    Return in about 6 months (around 9/26/2024), or if symptoms worsen or fail to improve, for f/u gender-affirming hormone therapy.    ASHLEY Wall MD

## 2024-03-27 LAB
ALBUMIN SERPL-MCNC: 4.6 G/DL (ref 3.5–5.2)
ALBUMIN/GLOB SERPL: 2.6 G/DL
ALP SERPL-CCNC: 54 U/L (ref 39–117)
ALT SERPL-CCNC: 18 U/L (ref 1–41)
AST SERPL-CCNC: 17 U/L (ref 1–40)
BILIRUB SERPL-MCNC: 0.3 MG/DL (ref 0–1.2)
BUN SERPL-MCNC: 8 MG/DL (ref 6–20)
BUN/CREAT SERPL: 11.6 (ref 7–25)
CALCIUM SERPL-MCNC: 9.3 MG/DL (ref 8.6–10.5)
CHLORIDE SERPL-SCNC: 104 MMOL/L (ref 98–107)
CHOLEST SERPL-MCNC: 143 MG/DL (ref 0–200)
CO2 SERPL-SCNC: 24.8 MMOL/L (ref 22–29)
CREAT SERPL-MCNC: 0.69 MG/DL (ref 0.76–1.27)
EGFRCR SERPLBLD CKD-EPI 2021: 134.2 ML/MIN/1.73
ESTRADIOL SERPL-MCNC: 237 PG/ML (ref 7.6–42.6)
ESTRONE SERPL-MCNC: 73 PG/ML (ref 0–174)
GLOBULIN SER CALC-MCNC: 1.8 GM/DL
GLUCOSE SERPL-MCNC: 76 MG/DL (ref 65–99)
HDLC SERPL-MCNC: 51 MG/DL (ref 40–60)
LDLC SERPL CALC-MCNC: 75 MG/DL (ref 0–100)
POTASSIUM SERPL-SCNC: 4.1 MMOL/L (ref 3.5–5.2)
PROGEST SERPL-MCNC: 0.1 NG/ML (ref 0–0.5)
PROT SERPL-MCNC: 6.4 G/DL (ref 6–8.5)
SODIUM SERPL-SCNC: 139 MMOL/L (ref 136–145)
TESTOST SERPL-MCNC: 9 NG/DL (ref 264–916)
TRIGL SERPL-MCNC: 89 MG/DL (ref 0–150)
VLDLC SERPL CALC-MCNC: 17 MG/DL (ref 5–40)

## 2024-09-19 ENCOUNTER — OFFICE VISIT (OUTPATIENT)
Dept: FAMILY MEDICINE CLINIC | Facility: CLINIC | Age: 24
End: 2024-09-19
Payer: COMMERCIAL

## 2024-09-19 VITALS
HEART RATE: 67 BPM | HEIGHT: 71 IN | DIASTOLIC BLOOD PRESSURE: 72 MMHG | RESPIRATION RATE: 14 BRPM | BODY MASS INDEX: 22.55 KG/M2 | SYSTOLIC BLOOD PRESSURE: 120 MMHG | OXYGEN SATURATION: 98 % | WEIGHT: 161.1 LBS

## 2024-09-19 DIAGNOSIS — Z13.1 SCREENING FOR DIABETES MELLITUS: ICD-10-CM

## 2024-09-19 DIAGNOSIS — Z11.59 ENCOUNTER FOR HEPATITIS C SCREENING TEST FOR LOW RISK PATIENT: ICD-10-CM

## 2024-09-19 DIAGNOSIS — F64.0 GENDER DYSPHORIA IN ADULT: ICD-10-CM

## 2024-09-19 DIAGNOSIS — Z13.6 ENCOUNTER FOR LIPID SCREENING FOR CARDIOVASCULAR DISEASE: ICD-10-CM

## 2024-09-19 DIAGNOSIS — Z00.00 ROUTINE HEALTH MAINTENANCE: Primary | ICD-10-CM

## 2024-09-19 DIAGNOSIS — Z23 NEED FOR VACCINATION: ICD-10-CM

## 2024-09-19 DIAGNOSIS — Z51.81 THERAPEUTIC DRUG MONITORING: ICD-10-CM

## 2024-09-19 DIAGNOSIS — Z13.220 ENCOUNTER FOR LIPID SCREENING FOR CARDIOVASCULAR DISEASE: ICD-10-CM

## 2024-09-19 DIAGNOSIS — E34.9 HORMONE IMBALANCE: ICD-10-CM

## 2024-09-19 PROCEDURE — 99395 PREV VISIT EST AGE 18-39: CPT | Performed by: FAMILY MEDICINE

## 2024-09-19 PROCEDURE — 99213 OFFICE O/P EST LOW 20 MIN: CPT | Performed by: FAMILY MEDICINE

## 2024-09-19 RX ORDER — ESTRADIOL VALERATE 20 MG/ML
INJECTION INTRAMUSCULAR
Qty: 5 ML | Refills: 3 | Status: SHIPPED | OUTPATIENT
Start: 2024-09-19

## 2025-03-18 LAB
ALBUMIN SERPL-MCNC: 4.4 G/DL (ref 4.3–5.2)
ALP SERPL-CCNC: 48 IU/L (ref 44–121)
ALT SERPL-CCNC: 13 IU/L (ref 0–44)
AST SERPL-CCNC: 20 IU/L (ref 0–40)
BILIRUB SERPL-MCNC: 0.4 MG/DL (ref 0–1.2)
BUN SERPL-MCNC: 7 MG/DL (ref 6–20)
BUN/CREAT SERPL: 13 (ref 9–20)
CALCIUM SERPL-MCNC: 9.1 MG/DL (ref 8.7–10.2)
CHLORIDE SERPL-SCNC: 103 MMOL/L (ref 96–106)
CHOLEST SERPL-MCNC: 132 MG/DL (ref 100–199)
CO2 SERPL-SCNC: 23 MMOL/L (ref 20–29)
CREAT SERPL-MCNC: 0.56 MG/DL (ref 0.76–1.27)
EGFRCR SERPLBLD CKD-EPI 2021: 142 ML/MIN/1.73
ESTRADIOL SERPL-MCNC: 128 PG/ML (ref 7.6–42.6)
ESTRONE SERPL-MCNC: 67 PG/ML (ref 0–174)
GLOBULIN SER CALC-MCNC: 2.1 G/DL (ref 1.5–4.5)
GLUCOSE SERPL-MCNC: 108 MG/DL (ref 70–99)
HCV IGG SERPL QL IA: NON REACTIVE
HDLC SERPL-MCNC: 52 MG/DL
LDLC SERPL CALC-MCNC: 69 MG/DL (ref 0–99)
POTASSIUM SERPL-SCNC: 4.1 MMOL/L (ref 3.5–5.2)
PROGEST SERPL-MCNC: 0.4 NG/ML (ref 0–0.5)
PROT SERPL-MCNC: 6.5 G/DL (ref 6–8.5)
SODIUM SERPL-SCNC: 136 MMOL/L (ref 134–144)
TESTOST SERPL-MCNC: 29 NG/DL (ref 264–916)
TRIGL SERPL-MCNC: 50 MG/DL (ref 0–149)
VLDLC SERPL CALC-MCNC: 11 MG/DL (ref 5–40)

## 2025-04-21 ENCOUNTER — OFFICE VISIT (OUTPATIENT)
Dept: FAMILY MEDICINE CLINIC | Facility: CLINIC | Age: 25
End: 2025-04-21
Payer: COMMERCIAL

## 2025-04-21 VITALS
WEIGHT: 167.5 LBS | SYSTOLIC BLOOD PRESSURE: 118 MMHG | HEART RATE: 87 BPM | RESPIRATION RATE: 12 BRPM | BODY MASS INDEX: 23.45 KG/M2 | OXYGEN SATURATION: 99 % | HEIGHT: 71 IN | DIASTOLIC BLOOD PRESSURE: 66 MMHG

## 2025-04-21 DIAGNOSIS — F64.0 GENDER DYSPHORIA IN ADULT: Primary | ICD-10-CM

## 2025-04-21 DIAGNOSIS — Z51.81 THERAPEUTIC DRUG MONITORING: ICD-10-CM

## 2025-04-21 DIAGNOSIS — E34.9 HORMONE IMBALANCE: ICD-10-CM

## 2025-04-21 DIAGNOSIS — Z23 NEED FOR VACCINATION: ICD-10-CM

## 2025-04-21 LAB
ALBUMIN SERPL-MCNC: 4.5 G/DL (ref 3.5–5.2)
ALBUMIN/GLOB SERPL: 2.1 G/DL
ALP SERPL-CCNC: 44 U/L (ref 39–117)
ALT SERPL-CCNC: 17 U/L (ref 1–41)
AST SERPL-CCNC: 17 U/L (ref 1–40)
BILIRUB SERPL-MCNC: 0.4 MG/DL (ref 0–1.2)
BUN SERPL-MCNC: 6 MG/DL (ref 6–20)
BUN/CREAT SERPL: 10.5 (ref 7–25)
CALCIUM SERPL-MCNC: 9.4 MG/DL (ref 8.6–10.5)
CHLORIDE SERPL-SCNC: 104 MMOL/L (ref 98–107)
CHOLEST SERPL-MCNC: 131 MG/DL (ref 0–200)
CO2 SERPL-SCNC: 24.3 MMOL/L (ref 22–29)
CREAT SERPL-MCNC: 0.57 MG/DL (ref 0.76–1.27)
EGFRCR SERPLBLD CKD-EPI 2021: 141.3 ML/MIN/1.73
GLOBULIN SER CALC-MCNC: 2.1 GM/DL
GLUCOSE SERPL-MCNC: 83 MG/DL (ref 65–99)
HDLC SERPL-MCNC: 55 MG/DL (ref 40–60)
LDLC SERPL CALC-MCNC: 65 MG/DL (ref 0–100)
POTASSIUM SERPL-SCNC: 4.2 MMOL/L (ref 3.5–5.2)
PROT SERPL-MCNC: 6.6 G/DL (ref 6–8.5)
SODIUM SERPL-SCNC: 140 MMOL/L (ref 136–145)
TRIGL SERPL-MCNC: 47 MG/DL (ref 0–150)
VLDLC SERPL CALC-MCNC: 11 MG/DL (ref 5–40)

## 2025-04-21 PROCEDURE — 90480 ADMN SARSCOV2 VAC 1/ONLY CMP: CPT | Performed by: FAMILY MEDICINE

## 2025-04-21 PROCEDURE — 91320 SARSCV2 VAC 30MCG TRS-SUC IM: CPT | Performed by: FAMILY MEDICINE

## 2025-04-21 PROCEDURE — 99214 OFFICE O/P EST MOD 30 MIN: CPT | Performed by: FAMILY MEDICINE

## 2025-04-21 RX ORDER — ESTRADIOL VALERATE 20 MG/ML
INJECTION INTRAMUSCULAR
Qty: 5 ML | Refills: 3 | Status: SHIPPED | OUTPATIENT
Start: 2025-04-21

## 2025-04-21 NOTE — PROGRESS NOTES
"Chief Complaint  Gender dysphoria in adult    Subjective    History of Present Illness  History of Present Illness  The patient presents for ongoing management of gender dysphoria.    A regimen of estradiol, administered once weekly, has been followed for approximately 1.5 years. Satisfaction with the results and no complications related to the injections are reported. The injection schedule typically falls early in the week, either Monday or Tuesday, with the most recent injection being on 04/16/2025. The potential addition of progesterone was discussed during the previous visit, but contentment with the current treatment plan was expressed.    Bupropion was discontinued in late fall or early winter, and she has been managing well without it.    The initial dose of the HPV vaccine was received, but the series was not completed due to the onset of the COVID-19 pandemic. She declines additional vaccines today.      Objective     Vital Signs:   /66   Pulse 87   Resp 12   Ht 180.3 cm (71\")   Wt 76 kg (167 lb 8 oz)   SpO2 99%   BMI 23.36 kg/m²   Physical Exam  Vitals and nursing note reviewed.   Constitutional:       General: She is not in acute distress.     Appearance: Normal appearance. She is not ill-appearing.   Cardiovascular:      Rate and Rhythm: Normal rate and regular rhythm.      Pulses: Normal pulses.      Heart sounds: Normal heart sounds. No murmur heard.  Pulmonary:      Effort: Pulmonary effort is normal. No respiratory distress.      Breath sounds: Normal breath sounds. No rales.   Neurological:      Mental Status: She is alert and oriented to person, place, and time. Mental status is at baseline.   Psychiatric:         Mood and Affect: Mood normal.         Behavior: Behavior normal.                   Assessment and Plan   Diagnoses and all orders for this visit:    1. Gender dysphoria in adult (Primary)  -     Estradiol  -     Estrone  -     Testosterone  -     Comprehensive Metabolic " Panel  -     Progesterone  -     Lipid Panel  -     estradiol valerate (DELESTROGEN) 20 MG/ML injection; Inject 0.15 ml (3 mg) subcutaneously twice weekly. Please discard vial 28 days after opening/puncturing.  Dispense: 5 mL; Refill: 3    2. Hormone imbalance  -     Estradiol  -     Estrone  -     Testosterone  -     Comprehensive Metabolic Panel  -     Progesterone  -     Lipid Panel  -     estradiol valerate (DELESTROGEN) 20 MG/ML injection; Inject 0.15 ml (3 mg) subcutaneously twice weekly. Please discard vial 28 days after opening/puncturing.  Dispense: 5 mL; Refill: 3    3. Therapeutic drug monitoring  -     Estradiol  -     Estrone  -     Testosterone  -     Comprehensive Metabolic Panel  -     Progesterone  -     Lipid Panel    4. Need for vaccination  -     COVID-19 (Pfizer) 12yrs+ (COMIRNATY)      Assessment & Plan  1. Hormone therapy.  - A prescription refill for estradiol 0.3 mL weekly will be provided, allowing monthly refills.  - Laboratory tests will be conducted today to monitor hormone levels.    2. Vaccine.  - COVID booster today.    Recommended follow up as below. Encouraged communication via [employer] in the meantime.     Patient was given instructions and counseling regarding her condition or for health maintenance advice. Please see specific information pulled into the AVS (placed there by myself) if appropriate.    Return in about 6 months (around 10/21/2025), or if symptoms worsen or fail to improve, for f/u gender-affirming hormone therapy.    Patient or patient representative verbalized consent for the use of Ambient Listening during the visit with  Marvin Wall MD for chart documentation. 4/21/2025  13:34 EDT    ASHLEY Wall MD

## 2025-04-22 LAB
ESTRADIOL SERPL-MCNC: 268 PG/ML (ref 7.6–42.6)
ESTRONE SERPL-MCNC: 48 PG/ML (ref 0–174)
PROGEST SERPL-MCNC: 0.1 NG/ML (ref 0–0.5)
TESTOST SERPL-MCNC: 18 NG/DL (ref 264–916)